# Patient Record
(demographics unavailable — no encounter records)

---

## 2024-10-23 NOTE — HEALTH RISK ASSESSMENT
[No] : In the past 12 months have you used drugs other than those required for medical reasons? No [No falls in past year] : Patient reported no falls in the past year [0] : 2) Feeling down, depressed, or hopeless: Not at all (0) [PHQ-2 Negative - No further assessment needed] : PHQ-2 Negative - No further assessment needed [Former] : Former [> 15 Years] : > 15 Years [Good] : ~his/her~  mood as  good [Patient reported bone density results were normal] : Patient reported bone density results were normal [Patient reported colonoscopy was normal] : Patient reported colonoscopy was normal [None] : None [With Family] : lives with family [] :  [# Of Children ___] : has [unfilled] children [Feels Safe at Home] : Feels safe at home [Fully functional (bathing, dressing, toileting, transferring, walking, feeding)] : Fully functional (bathing, dressing, toileting, transferring, walking, feeding) [Fully functional (using the telephone, shopping, preparing meals, housekeeping, doing laundry, using] : Fully functional and needs no help or supervision to perform IADLs (using the telephone, shopping, preparing meals, housekeeping, doing laundry, using transportation, managing medications and managing finances) [Reports changes in hearing] : Reports changes in hearing [Smoke Detector] : smoke detector [Safety elements used in home] : safety elements used in home [de-identified] : nephrology, urology [de-identified] : active  [de-identified] : mediterranean diet  [OMB2Reczv] : 0 [Change in mental status noted] : No change in mental status noted [Language] : denies difficulty with language [Reports changes in vision] : Reports no changes in vision [Reports normal functional visual acuity (ie: able to read med bottle)] : Reports poor functional visual acuity.  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

## 2024-10-23 NOTE — HISTORY OF PRESENT ILLNESS
[FreeTextEntry1] : Patient presents today for follow-up of chronic medical conditions.  [de-identified] : [place] male with long standing HTN, CAD s/p MI and stent, BPH, gout  Had recent gout attack again - started taking old prednisone prescription- has started to help with pain and swelling   1/29 - had shoulder pain - got steroid shot  Saw cardiology - found severe MR on recent TTE - remains asx from cardiac perspective Plan to repeat TTE in 6 months Some forgetfulness but otherwise feels well

## 2024-10-23 NOTE — PLAN
[FreeTextEntry1] : imp--atherosclerosis of coronary arteries---remote MI and stenting; asx.  dilated aortic root---per echo of 8/25/2021, Ao root dimension-4.0; ascending aorta=4.6  ectatic abdominal aorta---w/o evidence of AAA on US abdominal aorta, 12/19/18  CKD---stable creats - follows with Dr. Jacqueline Vazquez  bronchiectasis w/o acute exacerbation; asx.  hypertension---resting BP acceptable for age  plan--  continue current meds  OV 4 mos.  [ ] hearing test - pt defers as he no longer drives  #Osteoporosis trial of fosamax  #Hypothyroidism TSH elevated  continue synthroid 100 mcg repeat TFTs 4-6 weeks  #Acute gout attack RESOLVED   #Severe MR s/p MVR - with improvement  #fatigue monitor HR, monitor BP  monitor anemia

## 2024-10-25 NOTE — DISCUSSION/SUMMARY
[FreeTextEntry1] : 92 year old man s/p mitral DARRIN.  Echo reviewed and just shows mild MR. [EKG obtained to assist in diagnosis and management of assessed problem(s)] : EKG obtained to assist in diagnosis and management of assessed problem(s)

## 2024-10-25 NOTE — HISTORY OF PRESENT ILLNESS
[FreeTextEntry1] : 92 year old man doing well after mitral valve DARRIN.  He has no shortness of breath or chest discomfort.  Some continuing issues with memeory.  Echo recently repeated.

## 2025-03-12 NOTE — HISTORY OF PRESENT ILLNESS
[FreeTextEntry1] : Patient presents today for follow-up of chronic medical conditions.  [de-identified] : [place] male with long standing HTN, CAD s/p MI and stent, BPH, gout  Has been doing well overall  Some shoulder pain - see orthopedist for injections with good relief No recent falls -  Wife is taking care of him at home - needs assistance with clothing, bathing etc  Had recent gout attack again - started taking old prednisone prescription- has started to help with pain and swelling   1/29 - had shoulder pain - got steroid shot  Saw cardiology - found severe MR on recent TTE - remains asx from cardiac perspective Plan to repeat TTE in 6 months Some forgetfulness but otherwise feels well

## 2025-03-12 NOTE — HEALTH RISK ASSESSMENT
[No] : In the past 12 months have you used drugs other than those required for medical reasons? No [No falls in past year] : Patient reported no falls in the past year [0] : 2) Feeling down, depressed, or hopeless: Not at all (0) [PHQ-2 Negative - No further assessment needed] : PHQ-2 Negative - No further assessment needed [Former] : Former [> 15 Years] : > 15 Years [Good] : ~his/her~  mood as  good [Patient reported bone density results were normal] : Patient reported bone density results were normal [Patient reported colonoscopy was normal] : Patient reported colonoscopy was normal [None] : None [With Family] : lives with family [] :  [# Of Children ___] : has [unfilled] children [Feels Safe at Home] : Feels safe at home [Fully functional (bathing, dressing, toileting, transferring, walking, feeding)] : Fully functional (bathing, dressing, toileting, transferring, walking, feeding) [Fully functional (using the telephone, shopping, preparing meals, housekeeping, doing laundry, using] : Fully functional and needs no help or supervision to perform IADLs (using the telephone, shopping, preparing meals, housekeeping, doing laundry, using transportation, managing medications and managing finances) [Reports changes in hearing] : Reports changes in hearing [Smoke Detector] : smoke detector [Safety elements used in home] : safety elements used in home [de-identified] : nephrology, urology [de-identified] : active  [de-identified] : mediterranean diet  [[id number]] : 0 [Change in mental status noted] : No change in mental status noted [Language] : denies difficulty with language [Reports changes in vision] : Reports no changes in vision [Reports normal functional visual acuity (ie: able to read med bottle)] : Reports poor functional visual acuity.  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

## 2025-03-12 NOTE — PLAN
[FreeTextEntry1] : imp--atherosclerosis of coronary arteries---remote MI and stenting; asx.  dilated aortic root---per echo of 8/25/2021, Ao root dimension-4.0; ascending aorta=4.6  ectatic abdominal aorta---w/o evidence of AAA on US abdominal aorta, 12/19/18  CKD---stable creats - follows with Dr. Jacqueline Vazquez  bronchiectasis w/o acute exacerbation; asx.  hypertension---resting BP acceptable for age   [ ] hearing test - pt defers as he no longer drives  #Hypothyroidism TSH normal continue synthroid 100 mcg  #Acute gout attack RESOLVED  [ ] continue allopurinol - renally adjusted   #Severe MR s/p MVR - with improvement  #fatigue monitor HR, monitor BP  monitor anemia

## 2025-03-27 NOTE — DISCUSSION/SUMMARY
[FreeTextEntry1] : Structural HD s/p MTEER.  Also aortic valve and ascending aorta disease.  Asymptomaitc.  Repeat TTE ordered. [EKG obtained to assist in diagnosis and management of assessed problem(s)] : EKG obtained to assist in diagnosis and management of assessed problem(s)

## 2025-03-27 NOTE — PHYSICAL EXAM

## 2025-03-27 NOTE — HISTORY OF PRESENT ILLNESS
[FreeTextEntry1] : 92 year old patient with hx of CAD, MR s/P MTEER, dilated ascending aorta.  He has dementia.  History from wife.  He has no complaints of chest discomfort or SOB.

## 2025-06-13 NOTE — HISTORY OF PRESENT ILLNESS
[FreeTextEntry1] : Patient presents today because of panful deformed mycotic nails. He is 93 and cannot care for himself. He complains of pain and worsening. He has bilateral hallux mycotic nails and distal subungual onychomycosis and pain. Right 2nd toe end-stage mycotic nail that is yellow, thick, brittle with subungual debris. Pain at the top and tip.

## 2025-06-13 NOTE — ASSESSMENT
[FreeTextEntry1] : Impression: Onychomycosis. Pain.   Treatment: I manually and mechanically debrided mycotic nails x3 using a small straight nail splitter and rotary pantera. The nails were aggressively debrided and debulked to make them comfortable in shoe gear. He will follow-up in the office as needed.

## 2025-06-13 NOTE — PHYSICAL EXAM
[Delayed in the Right Toes] : capillary refills delayed in the right toes [Delayed in the Left Toes] : capillary refills delayed in the left toes [2+] : right foot dorsalis pedis 2+ [0] : left foot dorsalis pedis 0 [No Joint Swelling] : no joint swelling [] : normal strength/tone [Normal Foot/Ankle] : Both lower extremities were exposed and visualized. Standing exam demonstrates normal foot posture and alignment. Hindfoot exam shows no hindfoot valgus or varus [Sensation] : the sensory exam was normal to light touch and pinprick [No Focal Deficits] : no focal deficits [Motor Exam] : the motor exam was normal [Deep Tendon Reflexes (DTR)] : deep tendon reflexes were 2+ and symmetric [FreeTextEntry3] : Absent hair growth. Thin atrophic skin. The patient has a class finding of Q8-Two Class B findings.  [FreeTextEntry1] : Bilateral hallux and right 2nd toenails are onychomycotic, yellow, dystrophic, thick, with subungual debris. They are painful at the tip and tops.  [Diminished Throughout Right Foot] : normal sensation with monofilament testing throughout right foot [Diminished Throughout Left Foot] : normal sensation with monofilament testing throughout left foot

## 2025-06-13 NOTE — PHYSICAL EXAM
[Delayed in the Right Toes] : capillary refills delayed in the right toes [Delayed in the Left Toes] : capillary refills delayed in the left toes [2+] : right foot dorsalis pedis 2+ [0] : left foot dorsalis pedis 0 [No Joint Swelling] : no joint swelling [] : normal strength/tone [Normal Foot/Ankle] : Both lower extremities were exposed and visualized. Standing exam demonstrates normal foot posture and alignment. Hindfoot exam shows no hindfoot valgus or varus [Sensation] : the sensory exam was normal to light touch and pinprick [No Focal Deficits] : no focal deficits [Deep Tendon Reflexes (DTR)] : deep tendon reflexes were 2+ and symmetric [Motor Exam] : the motor exam was normal [FreeTextEntry3] : Absent hair growth. Thin atrophic skin. The patient has a class finding of Q8-Two Class B findings.  [FreeTextEntry1] : Bilateral hallux and right 2nd toenails are onychomycotic, yellow, dystrophic, thick, with subungual debris. They are painful at the tip and tops.  [Diminished Throughout Right Foot] : normal sensation with monofilament testing throughout right foot [Diminished Throughout Left Foot] : normal sensation with monofilament testing throughout left foot

## 2025-07-24 NOTE — PHYSICAL EXAM

## 2025-07-24 NOTE — DISCUSSION/SUMMARY
[FreeTextEntry1] : Moderate to severe AS.  Currently symptoms free.  Will monitor with sequential echocardogram. [EKG obtained to assist in diagnosis and management of assessed problem(s)] : EKG obtained to assist in diagnosis and management of assessed problem(s)

## 2025-07-24 NOTE — HISTORY OF PRESENT ILLNESS
[FreeTextEntry1] : 93 year old man with multivalvular disease.  S/P mitral DARRIN for severe MR.  Mild residual MR.  Moderate to severe AS.  He has no SOB, Chest discomfort or syncope.